# Patient Record
Sex: MALE | Race: WHITE | NOT HISPANIC OR LATINO | URBAN - METROPOLITAN AREA
[De-identification: names, ages, dates, MRNs, and addresses within clinical notes are randomized per-mention and may not be internally consistent; named-entity substitution may affect disease eponyms.]

---

## 2020-03-12 ENCOUNTER — HISTORICAL (OUTPATIENT)
Dept: ADMINISTRATIVE | Facility: HOSPITAL | Age: 65
End: 2020-03-12

## 2022-04-07 ENCOUNTER — HISTORICAL (OUTPATIENT)
Dept: ADMINISTRATIVE | Facility: HOSPITAL | Age: 67
End: 2022-04-07

## 2022-04-10 ENCOUNTER — HISTORICAL (OUTPATIENT)
Dept: ADMINISTRATIVE | Facility: HOSPITAL | Age: 67
End: 2022-04-10

## 2022-04-27 VITALS
DIASTOLIC BLOOD PRESSURE: 82 MMHG | HEIGHT: 71 IN | SYSTOLIC BLOOD PRESSURE: 134 MMHG | BODY MASS INDEX: 24.39 KG/M2 | WEIGHT: 174.19 LBS

## 2022-05-03 NOTE — HISTORICAL [facility] CERNER
This is a historical note converted from Yair. Formatting and pictures may have been removed.  Please reference Yair for original formatting and attached multimedia. Chief Complaint  Bilateral knee pain  History of Present Illness  64 Years??Male?non-smoker?presents to?[facility]?for?initial visit?for?bilateral?knee pain.? Patient points to ?medial knee.  ?   [facility]. 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 Feels left started hurting due to compensatory. Worse medial knees. Worse with use, better with rest. Notices at night. Hasnt affected ROM so much as pain. Uses cane at times. No swelling.?Has been doing acupuncture in town which helps going 1x/wk. Taking naproxen and oxycodone. Uses oxycodone 30 mg about 2 times daily prescribed by Dr. Rice. Uses naproxen bid, unsure dose. PCP is Rad[facility] Robison [facility]. Takes tylenol intermittently which helps some. Had Rusticon (spelling)?injections few years ago in knees that helped for some time.  ?   Onset: ?insidious over years  Current pain level: 2/10 (rated as?moderate) ?medication helping?and acupunture, took oxycodone this am?. Quality described as not bad. 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  PMH: OA b/l, cLBP, history afib s/p ablation  FHx: denies  Social: 50 pack year history quit 2 years ago; drinks few beers in afternoon daily; denies illicits; works in yard daily; lives in home in [place]  alone  Surgeries: multiple back surgeries for injuries  All: NKDA  Review of Systems  denies fever chills  no nausea vomiting  denies cough cold congestion recent illness  denies numbness tingling  denies pain in other body parts  +intermittent SERNA  denies CP  Physical Exam  Vitals & Measurements  T:?36.7? ?C (Oral)? HR:?83(Peripheral)? BP:?134/82?  HT:?180?cm? WT:?79?kg? BMI:?24.38?  General: no acute distress  CV: RRR  Resp: unlabored breathing  Abdomen:?nondistended  Neuro: no focal deficit appreciated, moves all extremities equally  Psych: appropriate mood and affect  ?   R knee:  Inspection: No effusion edema?or erythema appreciated  Palpation: ttp medial joint line  ROM:  with patient in supine position: extension to 10 degrees below horizontal; flexion to 70 degrees between tibia and femur  Sensation: normal light touch  Special tests:  Anterior Lachman: neg  Posterior Lachman: neg  McMurrey: pos  ?   L?knee:  Inspection: No effusion edema?or erythema appreciated  Palpation: ttp medial joint line  ROM:  with patient in supine position: extension to 10 degrees below horizontal; flexion to?60 degrees between tibia and femur  Sensation: normal light touch  Special tests:  Anterior Lachman: neg  Posterior Lachman: neg  McMurrey: pos  ?   Gait: non-antalgic gait, walks without assistive device  Assessment/Plan  1.?Bilateral primary osteoarthritis of knee?M17.0  ?DX: ?bilateral?knee osteoarthritis  Discussed with patient diagnosis and treatment recommendations.? Handout given.  Imaging:?radiological studies ordered and independently reviewed; discussed with patient; agree with?radiologist interpretation; severe degenerative changes b/l R>L with subchondral sclerosis and cyst formation right, bilateral osteophytes, vascular calcifications, lateral and medial joint space narrowing on both sides  Treatment plan: conservative measures, consider ortho referral at follow up if unsatisfactory response to PT; continue  acupuncture  Weight Management is paramount:?maintain weight  Procedure:?discussed CSI vs VS injections as treatment options; declines CSI after shared decision making, prefers to try therapy and continue po meds, acupuncture  Activity:?Activity as tolerated; HEP to included aerobic conditioning with non-painful activity and ROM/STG exercises; proper footwear; assistive device to avoid limping  Therapy:?formal PT/OT ordered  Medication:?first line treatment with daily acetaminophen?1000 mg three times daily; medication precautions given; continue oxycodone as per pain mgmt, naproxen BID, offered alternative nsaid and he declined  RTC:?3 months  Additional work-up:?none  Ordered:  Clinic Follow up, *Est. 06/12/20 3:00:00 CDT, Order for future visit, Bilateral primary osteoarthritis of knee  Alcohol drinker, [facility]  ?  2.?Alcohol drinker?Z72.89  ?-safe drinking practices encouraged, not more than 14 standard drinks a week or 2 a day  Ordered:  Clinic Follow up, *Est. 06/12/20 3:00:00 CDT, Order for future visit, Bilateral primary osteoarthritis of knee  Alcohol drinker, [facility]  ?   Medications  aspirin 81 mg oral Delayed Release (EC) tablet, 81 mg= 1 tab(s), Oral, Daily  CARISOPRODOL 350MG TAB  clonazePAM 0.5 mg oral tablet, 0.5 mg= 1 tab(s), Oral, BID  omega-3 polyunsaturated fatty acids (Fish Oil 1000mg Cap), Oral  OXYCODONE 30MG TAB, 30 mg= 1 tab(s), Oral, QID      Obed Daugherty?was seen in?[facility].??Discussed with?resident at the time of the visit.?History of Present Illness, Physical Exam, and Assessment and Plan reviewed. Treatment plan is reasonable and appropriate. Compliance with treatment recommendations is important.??Radiology images independently reviewed and agree with radiologist interpretation.?- Cher Quesada MD MPH

## 2022-06-29 RX ORDER — METOPROLOL TARTRATE 50 MG/1
25 TABLET ORAL 2 TIMES DAILY
COMMUNITY

## 2022-06-29 RX ORDER — ACETAMINOPHEN 500 MG
4000 TABLET ORAL DAILY
COMMUNITY

## 2022-06-29 RX ORDER — OXYCODONE HYDROCHLORIDE 30 MG/1
30 TABLET ORAL EVERY 6 HOURS PRN
Status: ON HOLD | COMMUNITY
End: 2022-07-20 | Stop reason: HOSPADM

## 2022-06-29 RX ORDER — LANOLIN ALCOHOL/MO/W.PET/CERES
2 CREAM (GRAM) TOPICAL DAILY
COMMUNITY

## 2022-07-17 ENCOUNTER — ANESTHESIA EVENT (OUTPATIENT)
Dept: SURGERY | Facility: HOSPITAL | Age: 67
End: 2022-07-17
Payer: OTHER GOVERNMENT

## 2022-07-18 NOTE — ANESTHESIA PREPROCEDURE EVALUATION
07/17/2022  Obed Daugherty is a 67 y.o., male presents to outpatient surgery for radical robotic prostatectomy with pelvic lymph node dissection for prostate cancer.   Patient tolerated general anesthesia at outside hospital for lap odalis with minimal phenylephrine support.     Nuclear stress test July 1, 2022  EF 53-69%  no evidence of ischemia      Last 3 sets of Vitals    Vitals - 1 value per visit 3/12/2020 6/29/2022 7/19/2022   SYSTOLIC 134 - 145   DIASTOLIC 82 - 82   Pulse - - 91   Temp - - 98.2   Resp - - 18   SPO2 - - 96   Weight (lb) 174.16 194 -   Weight (kg) 79 87.998 -   Height 70.866 70 -   BMI (Calculated) 24.4 27.8 -     Transthoracic echo 2018  EF 53% with grade 3 diastolic dysfunction  2+ DTR, 1+ MR  PA systolic pressure 48   unable to rule out PFO    Pre-op Assessment    I have reviewed the Patient Summary Reports.     I have reviewed the Nursing Notes. I have reviewed the NPO Status.   I have reviewed the Medications.     Review of Systems  Anesthesia Hx:  Personal Hx of Anesthesia complications Denies Post-Operative Nausea/Vomiting.  Denies Difficult Intubation.  Denies Dental Injury.   Social:  Non-Smoker    Cardiovascular:  Cardiovascular Normal   Functional Capacity good / => 4 METS  Disorder of Cardiac Rhythm, Atrial Fibrillation, Paroxysmal Atrial Fibrillation    Pulmonary:  Pulmonary Normal        Physical Exam  General: Well nourished, Cooperative, Alert and Oriented    Airway:  Mallampati: II   Mouth Opening: Normal  TM Distance: Normal  Tongue: Normal  Neck ROM: Normal ROM    Dental:  Intact    Chest/Lungs:  Clear to auscultation, Normal Respiratory Rate    Heart:  Rate: Normal  Rhythm: Regular Rhythm        Anesthesia Plan  Type of Anesthesia, risks & benefits discussed:    Anesthesia Type: Gen ETT  Intra-op Monitoring Plan: Standard ASA Monitors  Post Op Pain Control Plan:  multimodal analgesia and IV/PO Opioids PRN  Induction:  IV  Airway Plan: Direct  Informed Consent: Informed consent signed with the Patient and all parties understand the risks and agree with anesthesia plan.  All questions answered.   ASA Score: 3  Day of Surgery Review of History & Physical: H&P Update referred to the surgeon/provider.  Anesthesia Plan Notes: CBC, BMP, type and crossmatch pending    Ready For Surgery From Anesthesia Perspective.     .

## 2022-07-19 ENCOUNTER — ANESTHESIA (OUTPATIENT)
Dept: SURGERY | Facility: HOSPITAL | Age: 67
End: 2022-07-19
Payer: OTHER GOVERNMENT

## 2022-07-19 ENCOUNTER — HOSPITAL ENCOUNTER (OUTPATIENT)
Facility: HOSPITAL | Age: 67
Discharge: HOME | End: 2022-07-20
Attending: UROLOGY | Admitting: UROLOGY
Payer: OTHER GOVERNMENT

## 2022-07-19 DIAGNOSIS — C61 MALIGNANT NEOPLASM OF PROSTATE: ICD-10-CM

## 2022-07-19 LAB
ABORH RETYPE: NORMAL
ANION GAP SERPL CALC-SCNC: 11 MEQ/L
BASOPHILS # BLD AUTO: 0.02 X10(3)/MCL (ref 0–0.2)
BASOPHILS NFR BLD AUTO: 0.4 %
BUN SERPL-MCNC: 8.8 MG/DL (ref 8.4–25.7)
CALCIUM SERPL-MCNC: 9.5 MG/DL (ref 8.8–10)
CHLORIDE SERPL-SCNC: 101 MMOL/L (ref 98–107)
CO2 SERPL-SCNC: 26 MMOL/L (ref 23–31)
CREAT SERPL-MCNC: 1.01 MG/DL (ref 0.73–1.18)
CREAT/UREA NIT SERPL: 9
EOSINOPHIL # BLD AUTO: 0.12 X10(3)/MCL (ref 0–0.9)
EOSINOPHIL NFR BLD AUTO: 2.3 %
ERYTHROCYTE [DISTWIDTH] IN BLOOD BY AUTOMATED COUNT: 11.9 % (ref 11.5–17)
GLUCOSE SERPL-MCNC: 117 MG/DL (ref 82–115)
GROUP & RH: NORMAL
HCT VFR BLD AUTO: 39 % (ref 42–52)
HCT VFR BLD AUTO: 41.1 % (ref 42–52)
HGB BLD-MCNC: 13.2 GM/DL (ref 14–18)
HGB BLD-MCNC: 14.5 GM/DL (ref 14–18)
IMM GRANULOCYTES # BLD AUTO: 0.03 X10(3)/MCL (ref 0–0.04)
IMM GRANULOCYTES NFR BLD AUTO: 0.6 %
INDIRECT COOMBS GEL: NORMAL
LYMPHOCYTES # BLD AUTO: 0.89 X10(3)/MCL (ref 0.6–4.6)
LYMPHOCYTES NFR BLD AUTO: 17.2 %
MCH RBC QN AUTO: 34.4 PG (ref 27–31)
MCHC RBC AUTO-ENTMCNC: 35.3 MG/DL (ref 33–36)
MCV RBC AUTO: 97.4 FL (ref 80–94)
MONOCYTES # BLD AUTO: 0.71 X10(3)/MCL (ref 0.1–1.3)
MONOCYTES NFR BLD AUTO: 13.8 %
NEUTROPHILS # BLD AUTO: 3.4 X10(3)/MCL (ref 2.1–9.2)
NEUTROPHILS NFR BLD AUTO: 65.7 %
NRBC BLD AUTO-RTO: 0 %
PLATELET # BLD AUTO: 164 X10(3)/MCL (ref 130–400)
PMV BLD AUTO: 8.8 FL (ref 7.4–10.4)
POTASSIUM SERPL-SCNC: 4.1 MMOL/L (ref 3.5–5.1)
RBC # BLD AUTO: 4.22 X10(6)/MCL (ref 4.7–6.1)
SODIUM SERPL-SCNC: 138 MMOL/L (ref 136–145)
WBC # SPEC AUTO: 5.2 X10(3)/MCL (ref 4.5–11.5)

## 2022-07-19 PROCEDURE — 00865 ANES XTRPRT LWR ABD PRST8ECT: CPT | Performed by: UROLOGY

## 2022-07-19 PROCEDURE — 25000003 PHARM REV CODE 250: Performed by: NURSE ANESTHETIST, CERTIFIED REGISTERED

## 2022-07-19 PROCEDURE — 63600175 PHARM REV CODE 636 W HCPCS: Performed by: NURSE ANESTHETIST, CERTIFIED REGISTERED

## 2022-07-19 PROCEDURE — 37000009 HC ANESTHESIA EA ADD 15 MINS: Performed by: UROLOGY

## 2022-07-19 PROCEDURE — 63600175 PHARM REV CODE 636 W HCPCS: Performed by: UROLOGY

## 2022-07-19 PROCEDURE — 37000008 HC ANESTHESIA 1ST 15 MINUTES: Performed by: UROLOGY

## 2022-07-19 PROCEDURE — C1729 CATH, DRAINAGE: HCPCS | Performed by: UROLOGY

## 2022-07-19 PROCEDURE — 86920 COMPATIBILITY TEST SPIN: CPT | Performed by: UROLOGY

## 2022-07-19 PROCEDURE — 80048 BASIC METABOLIC PNL TOTAL CA: CPT | Performed by: UROLOGY

## 2022-07-19 PROCEDURE — 63600175 PHARM REV CODE 636 W HCPCS

## 2022-07-19 PROCEDURE — 94761 N-INVAS EAR/PLS OXIMETRY MLT: CPT

## 2022-07-19 PROCEDURE — 63600175 PHARM REV CODE 636 W HCPCS: Performed by: ANESTHESIOLOGY

## 2022-07-19 PROCEDURE — G0378 HOSPITAL OBSERVATION PER HR: HCPCS

## 2022-07-19 PROCEDURE — 27201423 OPTIME MED/SURG SUP & DEVICES STERILE SUPPLY: Performed by: UROLOGY

## 2022-07-19 PROCEDURE — 11000001 HC ACUTE MED/SURG PRIVATE ROOM

## 2022-07-19 PROCEDURE — 36000713 HC OR TIME LEV V EA ADD 15 MIN: Performed by: UROLOGY

## 2022-07-19 PROCEDURE — V2790 AMNIOTIC MEMBRANE: HCPCS | Performed by: UROLOGY

## 2022-07-19 PROCEDURE — G0379 DIRECT REFER HOSPITAL OBSERV: HCPCS

## 2022-07-19 PROCEDURE — 25000003 PHARM REV CODE 250: Performed by: UROLOGY

## 2022-07-19 PROCEDURE — 25000003 PHARM REV CODE 250: Performed by: ANESTHESIOLOGY

## 2022-07-19 PROCEDURE — 25000003 PHARM REV CODE 250

## 2022-07-19 PROCEDURE — 36000712 HC OR TIME LEV V 1ST 15 MIN: Performed by: UROLOGY

## 2022-07-19 PROCEDURE — 36415 COLL VENOUS BLD VENIPUNCTURE: CPT | Performed by: UROLOGY

## 2022-07-19 PROCEDURE — 86850 RBC ANTIBODY SCREEN: CPT | Performed by: UROLOGY

## 2022-07-19 PROCEDURE — 71000033 HC RECOVERY, INTIAL HOUR: Performed by: UROLOGY

## 2022-07-19 PROCEDURE — 36620 INSERTION CATHETER ARTERY: CPT | Performed by: ANESTHESIOLOGY

## 2022-07-19 PROCEDURE — 85014 HEMATOCRIT: CPT | Performed by: UROLOGY

## 2022-07-19 PROCEDURE — 27000221 HC OXYGEN, UP TO 24 HOURS

## 2022-07-19 PROCEDURE — 85025 COMPLETE CBC W/AUTO DIFF WBC: CPT | Performed by: UROLOGY

## 2022-07-19 DEVICE — PATCH AMNION DUAL LAYER 4X8CM: Type: IMPLANTABLE DEVICE | Site: PROSTATE | Status: FUNCTIONAL

## 2022-07-19 RX ORDER — SODIUM CHLORIDE 9 MG/ML
INJECTION, SOLUTION INTRAVENOUS CONTINUOUS
Status: DISCONTINUED | OUTPATIENT
Start: 2022-07-19 | End: 2022-07-20 | Stop reason: HOSPADM

## 2022-07-19 RX ORDER — ACETAMINOPHEN 10 MG/ML
INJECTION, SOLUTION INTRAVENOUS
Status: DISPENSED
Start: 2022-07-19 | End: 2022-07-19

## 2022-07-19 RX ORDER — GABAPENTIN 300 MG/1
300 CAPSULE ORAL ONCE
Status: COMPLETED | OUTPATIENT
Start: 2022-07-19 | End: 2022-07-19

## 2022-07-19 RX ORDER — CEFAZOLIN SODIUM 2 G/50ML
2 SOLUTION INTRAVENOUS
Status: DISCONTINUED | OUTPATIENT
Start: 2022-07-19 | End: 2022-07-20 | Stop reason: HOSPADM

## 2022-07-19 RX ORDER — METHOCARBAMOL 100 MG/ML
INJECTION, SOLUTION INTRAMUSCULAR; INTRAVENOUS
Status: COMPLETED
Start: 2022-07-19 | End: 2022-07-19

## 2022-07-19 RX ORDER — HYDROCODONE BITARTRATE AND ACETAMINOPHEN 500; 5 MG/1; MG/1
TABLET ORAL
Status: DISCONTINUED | OUTPATIENT
Start: 2022-07-19 | End: 2022-07-20 | Stop reason: HOSPADM

## 2022-07-19 RX ORDER — ZOLPIDEM TARTRATE 5 MG/1
5 TABLET ORAL NIGHTLY
Status: DISCONTINUED | OUTPATIENT
Start: 2022-07-19 | End: 2022-07-20 | Stop reason: HOSPADM

## 2022-07-19 RX ORDER — CEFAZOLIN SODIUM 2 G/50ML
2 SOLUTION INTRAVENOUS
Status: COMPLETED | OUTPATIENT
Start: 2022-07-19 | End: 2022-07-20

## 2022-07-19 RX ORDER — PROMETHAZINE HYDROCHLORIDE 25 MG/1
25 TABLET ORAL EVERY 6 HOURS PRN
Status: DISCONTINUED | OUTPATIENT
Start: 2022-07-19 | End: 2022-07-20 | Stop reason: HOSPADM

## 2022-07-19 RX ORDER — ONDANSETRON 4 MG/1
8 TABLET, ORALLY DISINTEGRATING ORAL EVERY 8 HOURS PRN
Status: DISCONTINUED | OUTPATIENT
Start: 2022-07-19 | End: 2022-07-20 | Stop reason: HOSPADM

## 2022-07-19 RX ORDER — MEPERIDINE HYDROCHLORIDE 25 MG/ML
12.5 INJECTION INTRAMUSCULAR; INTRAVENOUS; SUBCUTANEOUS EVERY 10 MIN PRN
Status: COMPLETED | OUTPATIENT
Start: 2022-07-19 | End: 2022-07-19

## 2022-07-19 RX ORDER — LIDOCAINE HYDROCHLORIDE 20 MG/ML
INJECTION, SOLUTION EPIDURAL; INFILTRATION; INTRACAUDAL; PERINEURAL
Status: DISCONTINUED | OUTPATIENT
Start: 2022-07-19 | End: 2022-07-19

## 2022-07-19 RX ORDER — MIDAZOLAM HYDROCHLORIDE 1 MG/ML
INJECTION INTRAMUSCULAR; INTRAVENOUS
Status: DISPENSED
Start: 2022-07-19 | End: 2022-07-19

## 2022-07-19 RX ORDER — ACETAMINOPHEN 10 MG/ML
1000 INJECTION, SOLUTION INTRAVENOUS EVERY 8 HOURS
Status: DISCONTINUED | OUTPATIENT
Start: 2022-07-19 | End: 2022-07-19

## 2022-07-19 RX ORDER — ROCURONIUM BROMIDE 10 MG/ML
INJECTION, SOLUTION INTRAVENOUS
Status: DISCONTINUED | OUTPATIENT
Start: 2022-07-19 | End: 2022-07-19

## 2022-07-19 RX ORDER — CELECOXIB 200 MG/1
200 CAPSULE ORAL ONCE
Status: COMPLETED | OUTPATIENT
Start: 2022-07-19 | End: 2022-07-19

## 2022-07-19 RX ORDER — ONDANSETRON 2 MG/ML
INJECTION INTRAMUSCULAR; INTRAVENOUS
Status: DISCONTINUED | OUTPATIENT
Start: 2022-07-19 | End: 2022-07-19

## 2022-07-19 RX ORDER — HYDROMORPHONE HYDROCHLORIDE 2 MG/ML
0.4 INJECTION, SOLUTION INTRAMUSCULAR; INTRAVENOUS; SUBCUTANEOUS EVERY 5 MIN PRN
Status: DISCONTINUED | OUTPATIENT
Start: 2022-07-19 | End: 2022-07-20 | Stop reason: HOSPADM

## 2022-07-19 RX ORDER — DEXAMETHASONE SODIUM PHOSPHATE 4 MG/ML
INJECTION, SOLUTION INTRA-ARTICULAR; INTRALESIONAL; INTRAMUSCULAR; INTRAVENOUS; SOFT TISSUE
Status: DISCONTINUED | OUTPATIENT
Start: 2022-07-19 | End: 2022-07-19

## 2022-07-19 RX ORDER — MIDAZOLAM HYDROCHLORIDE 1 MG/ML
INJECTION INTRAMUSCULAR; INTRAVENOUS
Status: DISCONTINUED | OUTPATIENT
Start: 2022-07-19 | End: 2022-07-19

## 2022-07-19 RX ORDER — METOPROLOL TARTRATE 25 MG/1
25 TABLET, FILM COATED ORAL 2 TIMES DAILY
Status: DISCONTINUED | OUTPATIENT
Start: 2022-07-19 | End: 2022-07-20 | Stop reason: HOSPADM

## 2022-07-19 RX ORDER — HYDROCODONE BITARTRATE AND ACETAMINOPHEN 10; 325 MG/1; MG/1
1 TABLET ORAL EVERY 4 HOURS PRN
Status: DISCONTINUED | OUTPATIENT
Start: 2022-07-19 | End: 2022-07-20 | Stop reason: HOSPADM

## 2022-07-19 RX ORDER — HYDROMORPHONE HYDROCHLORIDE 2 MG/ML
INJECTION, SOLUTION INTRAMUSCULAR; INTRAVENOUS; SUBCUTANEOUS
Status: DISCONTINUED | OUTPATIENT
Start: 2022-07-19 | End: 2022-07-19

## 2022-07-19 RX ORDER — PHENYLEPHRINE HYDROCHLORIDE 10 MG/ML
INJECTION INTRAVENOUS
Status: DISCONTINUED | OUTPATIENT
Start: 2022-07-19 | End: 2022-07-19

## 2022-07-19 RX ORDER — UBIDECARENONE 75 MG
2000 CAPSULE ORAL DAILY
Status: DISCONTINUED | OUTPATIENT
Start: 2022-07-20 | End: 2022-07-20 | Stop reason: HOSPADM

## 2022-07-19 RX ORDER — CEFAZOLIN SODIUM 1 G/3ML
INJECTION, POWDER, FOR SOLUTION INTRAMUSCULAR; INTRAVENOUS
Status: DISCONTINUED | OUTPATIENT
Start: 2022-07-19 | End: 2022-07-19

## 2022-07-19 RX ORDER — METHOCARBAMOL 100 MG/ML
1000 INJECTION, SOLUTION INTRAMUSCULAR; INTRAVENOUS ONCE
Status: COMPLETED | OUTPATIENT
Start: 2022-07-19 | End: 2022-07-19

## 2022-07-19 RX ORDER — BUPIVACAINE HYDROCHLORIDE 2.5 MG/ML
INJECTION, SOLUTION EPIDURAL; INFILTRATION; INTRACAUDAL
Status: DISCONTINUED | OUTPATIENT
Start: 2022-07-19 | End: 2022-07-19 | Stop reason: HOSPADM

## 2022-07-19 RX ORDER — PROPOFOL 10 MG/ML
VIAL (ML) INTRAVENOUS
Status: DISCONTINUED | OUTPATIENT
Start: 2022-07-19 | End: 2022-07-19

## 2022-07-19 RX ORDER — FENTANYL CITRATE 50 UG/ML
INJECTION, SOLUTION INTRAMUSCULAR; INTRAVENOUS
Status: DISCONTINUED | OUTPATIENT
Start: 2022-07-19 | End: 2022-07-19

## 2022-07-19 RX ORDER — KETOROLAC TROMETHAMINE 30 MG/ML
15 INJECTION, SOLUTION INTRAMUSCULAR; INTRAVENOUS EVERY 8 HOURS
Status: DISCONTINUED | OUTPATIENT
Start: 2022-07-19 | End: 2022-07-20 | Stop reason: HOSPADM

## 2022-07-19 RX ORDER — OXYCODONE HYDROCHLORIDE 5 MG/1
20 TABLET ORAL EVERY 6 HOURS PRN
Status: DISCONTINUED | OUTPATIENT
Start: 2022-07-19 | End: 2022-07-20 | Stop reason: HOSPADM

## 2022-07-19 RX ORDER — ACETAMINOPHEN 10 MG/ML
1000 INJECTION, SOLUTION INTRAVENOUS EVERY 8 HOURS
Status: DISPENSED | OUTPATIENT
Start: 2022-07-19 | End: 2022-07-20

## 2022-07-19 RX ORDER — CHOLECALCIFEROL (VITAMIN D3) 25 MCG
4000 TABLET ORAL DAILY
Status: DISCONTINUED | OUTPATIENT
Start: 2022-07-20 | End: 2022-07-20 | Stop reason: HOSPADM

## 2022-07-19 RX ORDER — ZOLPIDEM TARTRATE 5 MG/1
5 TABLET ORAL NIGHTLY
Status: ON HOLD | COMMUNITY
End: 2022-07-20 | Stop reason: HOSPADM

## 2022-07-19 RX ORDER — OXYBUTYNIN CHLORIDE 5 MG/1
5 TABLET ORAL 3 TIMES DAILY
Status: DISCONTINUED | OUTPATIENT
Start: 2022-07-19 | End: 2022-07-20 | Stop reason: HOSPADM

## 2022-07-19 RX ORDER — ZOLPIDEM TARTRATE 5 MG/1
5 TABLET ORAL NIGHTLY PRN
Status: DISCONTINUED | OUTPATIENT
Start: 2022-07-19 | End: 2022-07-20 | Stop reason: HOSPADM

## 2022-07-19 RX ORDER — EPHEDRINE SULFATE 50 MG/ML
INJECTION, SOLUTION INTRAVENOUS
Status: DISCONTINUED | OUTPATIENT
Start: 2022-07-19 | End: 2022-07-19

## 2022-07-19 RX ADMIN — HYDROMORPHONE HYDROCHLORIDE 0.4 MG: 2 INJECTION, SOLUTION INTRAMUSCULAR; INTRAVENOUS; SUBCUTANEOUS at 04:07

## 2022-07-19 RX ADMIN — SODIUM CHLORIDE, SODIUM GLUCONATE, SODIUM ACETATE, POTASSIUM CHLORIDE AND MAGNESIUM CHLORIDE: 526; 502; 368; 37; 30 INJECTION, SOLUTION INTRAVENOUS at 11:07

## 2022-07-19 RX ADMIN — SUGAMMADEX 100 MG: 100 INJECTION, SOLUTION INTRAVENOUS at 03:07

## 2022-07-19 RX ADMIN — HYDROMORPHONE HYDROCHLORIDE 0.4 MG: 2 INJECTION, SOLUTION INTRAMUSCULAR; INTRAVENOUS; SUBCUTANEOUS at 03:07

## 2022-07-19 RX ADMIN — EPHEDRINE SULFATE 5 MG: 50 INJECTION INTRAVENOUS at 02:07

## 2022-07-19 RX ADMIN — SODIUM CHLORIDE, SODIUM GLUCONATE, SODIUM ACETATE, POTASSIUM CHLORIDE AND MAGNESIUM CHLORIDE: 526; 502; 368; 37; 30 INJECTION, SOLUTION INTRAVENOUS at 03:07

## 2022-07-19 RX ADMIN — HYDROMORPHONE HYDROCHLORIDE 0.5 MG: 2 INJECTION INTRAMUSCULAR; INTRAVENOUS; SUBCUTANEOUS at 12:07

## 2022-07-19 RX ADMIN — FENTANYL CITRATE 50 MCG: 50 INJECTION, SOLUTION INTRAMUSCULAR; INTRAVENOUS at 12:07

## 2022-07-19 RX ADMIN — MEPERIDINE HYDROCHLORIDE 12.5 MG: 25 INJECTION INTRAMUSCULAR; INTRAVENOUS; SUBCUTANEOUS at 05:07

## 2022-07-19 RX ADMIN — CEFAZOLIN SODIUM 2 G: 2 SOLUTION INTRAVENOUS at 08:07

## 2022-07-19 RX ADMIN — CEFAZOLIN 2 G: 330 INJECTION, POWDER, FOR SOLUTION INTRAMUSCULAR; INTRAVENOUS at 12:07

## 2022-07-19 RX ADMIN — ROCURONIUM BROMIDE 20 MG: 10 SOLUTION INTRAVENOUS at 12:07

## 2022-07-19 RX ADMIN — SUGAMMADEX 50 MG: 100 INJECTION, SOLUTION INTRAVENOUS at 03:07

## 2022-07-19 RX ADMIN — ROCURONIUM BROMIDE 30 MG: 10 SOLUTION INTRAVENOUS at 02:07

## 2022-07-19 RX ADMIN — ZOLPIDEM TARTRATE 5 MG: 5 TABLET ORAL at 08:07

## 2022-07-19 RX ADMIN — HYDROCODONE BITARTRATE AND ACETAMINOPHEN 1 TABLET: 10; 325 TABLET ORAL at 08:07

## 2022-07-19 RX ADMIN — PHENYLEPHRINE HYDROCHLORIDE 100 MCG: 10 INJECTION INTRAVENOUS at 12:07

## 2022-07-19 RX ADMIN — ACETAMINOPHEN 1000 MG: 10 INJECTION, SOLUTION INTRAVENOUS at 11:07

## 2022-07-19 RX ADMIN — DEXAMETHASONE SODIUM PHOSPHATE 4 MG: 4 INJECTION, SOLUTION INTRA-ARTICULAR; INTRALESIONAL; INTRAMUSCULAR; INTRAVENOUS; SOFT TISSUE at 12:07

## 2022-07-19 RX ADMIN — EPHEDRINE SULFATE 5 MG: 50 INJECTION INTRAVENOUS at 03:07

## 2022-07-19 RX ADMIN — ROCURONIUM BROMIDE 40 MG: 10 SOLUTION INTRAVENOUS at 12:07

## 2022-07-19 RX ADMIN — GABAPENTIN 300 MG: 300 CAPSULE ORAL at 10:07

## 2022-07-19 RX ADMIN — MIDAZOLAM 2 MG: 1 INJECTION INTRAMUSCULAR; INTRAVENOUS at 11:07

## 2022-07-19 RX ADMIN — LIDOCAINE HYDROCHLORIDE 80 MG: 20 INJECTION, SOLUTION EPIDURAL; INFILTRATION; INTRACAUDAL; PERINEURAL at 12:07

## 2022-07-19 RX ADMIN — CELECOXIB 200 MG: 200 CAPSULE ORAL at 10:07

## 2022-07-19 RX ADMIN — METHOCARBAMOL 1000 MG: 100 INJECTION, SOLUTION INTRAMUSCULAR; INTRAVENOUS at 04:07

## 2022-07-19 RX ADMIN — KETOROLAC TROMETHAMINE 15 MG: 30 INJECTION, SOLUTION INTRAMUSCULAR at 09:07

## 2022-07-19 RX ADMIN — HYDROMORPHONE HYDROCHLORIDE 0.4 MG: 2 INJECTION, SOLUTION INTRAMUSCULAR; INTRAVENOUS; SUBCUTANEOUS at 05:07

## 2022-07-19 RX ADMIN — ONDANSETRON 4 MG: 2 INJECTION INTRAMUSCULAR; INTRAVENOUS at 03:07

## 2022-07-19 RX ADMIN — PROPOFOL 170 MG: 10 INJECTION, EMULSION INTRAVENOUS at 12:07

## 2022-07-19 RX ADMIN — METOPROLOL TARTRATE 25 MG: 25 TABLET, FILM COATED ORAL at 08:07

## 2022-07-19 RX ADMIN — ROCURONIUM BROMIDE 20 MG: 10 SOLUTION INTRAVENOUS at 01:07

## 2022-07-19 RX ADMIN — OXYBUTYNIN CHLORIDE 5 MG: 5 TABLET ORAL at 08:07

## 2022-07-19 NOTE — ANESTHESIA POSTPROCEDURE EVALUATION
Anesthesia Post Evaluation    Patient: Obed Daugherty    Procedure(s) Performed: Procedure(s) (LRB):  XI ROBOTIC PROSTATECTOMY (N/A)    Final Anesthesia Type: general      Patient location during evaluation: PACU  Patient participation: Yes- Able to Participate  Level of consciousness: awake and alert and oriented  Post-procedure vital signs: reviewed and stable  Pain management: adequate  Airway patency: patent  FLOR mitigation strategies: Verification of full reversal of neuromuscular block  PONV status at discharge: No PONV  Anesthetic complications: no      Cardiovascular status: blood pressure returned to baseline and stable  Respiratory status: spontaneous ventilation, unassisted and room air  Hydration status: euvolemic  Follow-up not needed.  Comments: [facility]          Vitals Value Taken Time   /73 07/19/22 1731   Temp 36.5 °C (97.7 °F) 07/19/22 1548   Pulse 74 07/19/22 1740   Resp 15 07/19/22 1745   SpO2 99 % 07/19/22 1740   Vitals shown include unvalidated device data.      Event Time   Out of Recovery 17:50:00         Pain/Geetha Score: Pain Rating Prior to Med Admin: 7 (7/19/2022  5:20 PM)  Geetha Score: 10 (7/19/2022  5:30 PM)

## 2022-07-19 NOTE — TRANSFER OF CARE
"Anesthesia Transfer of Care Note    Patient: Obed Daugherty    Procedure(s) Performed: Procedure(s) (LRB):  XI ROBOTIC PROSTATECTOMY (N/A)    Patient location: PACU    Anesthesia Type: general    Transport from OR: Transported from OR on 100% O2 by closed face mask with adequate spontaneous ventilation    Post pain: adequate analgesia    Post assessment: no apparent anesthetic complications and tolerated procedure well    Post vital signs: stable    Level of consciousness: awake, alert and oriented    Nausea/Vomiting: no nausea/vomiting    Complications: none    Transfer of care protocol was followed      Last vitals:   Visit Vitals  BP (!) 141/84 (BP Location: Left arm, Patient Position: Lying)   Pulse 79   Temp 36.8 °C (98.2 °F) (Oral)   Resp 17   Ht 5' 10" (1.778 m)   Wt 83.7 kg (184 lb 8.4 oz)   SpO2 98%   BMI 26.48 kg/m²     "

## 2022-07-19 NOTE — OP NOTE
PREOPERATIVE DIAGNOSIS(ES):  Prostate cancer    PROCEDURE(S)/OPERATION(S) PERFORMED:  1. Robot assist laparoscopic radical prostatectomy.  2. Robot assist bilateral pelvic lymph node dissection.  3. Implantation of allograft tissue    FINDINGS:  1. There was no evidence of prostate cancer outside the prostate.  2. YOSELIN revealed normal  3. He had a bladder neck sparing procedure.  4. His urethral bladder anastomosis was tested with 120 mL of normal saline. There was no  extravasation seen.  5. bilateral neurovascular bundles were spared  6. Endopelvic fascia was spared.    SPECIMENS:  1. Prostate plus seminal vesicles en bloc.  2. Anterior bladder neck lymph nodes  3. External iliac lymph nodes and obturator lymph nodes Bilaterally    FLUIDS:  1000 mL crystalloid.    ESTIMATED BLOOD LOSS:  100 ml     DRAINS:  1. 19-Chadian Lebron drain.  2. 18-Chadian Javed catheter.    CONDITION:  Stable to PACU.    INDICATION FOR PROCEDURE:  This is a 67 Male, who was found to have an elevated PSA. He then underwent a  prostate biopsy, which confirmed the above mentioned [place] score and clinical stage.  He was apprised of his options. He elected to proceed with robotic-assisted laparoscopic prostatectomy. Consents were signed. He understands the risk and complications and would like to proceed with surgery. PCDs and heparin were used for DVT prophylaxis. Appropriate antibiotics were used for antibiotic prophylaxis.    SUMMARY:  After adequate general anesthetic, he was carefully positioned in low lithotomy. His arms were  tucked at his sides. All pressure points were carefully padded to prevent neuromuscular injury.  The table was placed in a steep Trendelenburg position. The abdomen and genitalia were  shaved and prepped and draped sterile fashion. A time-out was performed with two patient identifiers.  A 16-Chadian 2-way Javed catheter was placed in the bladder with 10 mL of sterile water in the  balloon, and the bladder was  drained.    A Veress needle was used to access the peritoneal cavity at the umbilicus. Saline drop test and  advancement tests were negative. The abdomen was insufflated at low insufflation pressures of  CO2 gas. We insufflated with low flow and initial pressures below 4 mmHg and gradually  insufflated up to 15 mmHg. A 1 cm incision was made just above the belly button horizontally  and a 10-mm trocar camera trocar was inserted. Initial laparoscopy revealed findings  as  noted above.    We then placed 2 robotic trocars on either side of the midline measuring 18 cm from the midline  at the pubic bone up to the port sites which were 9 cm lateral of the umbilicus and another robot  trocar was placed in the left lateral abdomen two finger breaths off the left ASIC. Two assistant  ports were placed, one 12 mm Surgiquest trocar in the right lateral abdomen two finger breaths  off the right ASIC and another 5mm trocar in the right subcostal position. All ports were placed  under direct vision.    The robot was then docked.  We then took down some adhesions of the sigmoid colon in  order to fully retract the rectum out of the pelvis.After this, the peritoneum between the bladder   and the rectum was incised to expose the seminal vesicles and vas deferens. The seminal vesicles   were then swept up off denovillier's fascia. We then clipped and divided the vas deferens at the tip of the  seminal vesicles and then clipped and divided the vascular structures on the posterior lateral  edges of the seminal vesicles. The SVs and ampulla of the Vas were then dissected down into  their insertion into the prostate. We then incised through Denonvilliers fascia with cold scissors  and swept the prostate up off the rectum exposing the vascular bundles laterally and the apex of  the prostate in the midline.    After this was done, we then performed a bilateral extended pelvic lymph node dissection on  both the left and right-hand side.  The superior borders of dissection were the lateral border of  the external iliac arter, medially was where the ureter crosses over the external iliac artery, and  lateral where the circumflex iliac vein crosses over the external iliac artery. The distal medial  boundary was the bladder and the distal lateral boundary was the obturator fossa. The inferior  boundary was the endopelvic fascia. During the course of the dissection, the obturator nerve  was identified and kept out of harm's way. The bilateral nate packets taken were the external  iliac lymph nodes, internal iliac lymph nodes and obturator lymph nodes. A piece of Nuknit  hemostatic material was placed into the obturator fossa bilaterally.    The bladder was then dropped entering the space of Retzius by incising lateral to the medial  umbilical ligament on either side up until where they joined. The vas deferens were then  clipped and divided. Endopelvic fascia was exposed but not divided. We then cauterized and  divided the superficial dorsal vein and removed the anterior bladder neck fat and lymph nodes  overlying the junction of the bladder and the prostate.    We then switched to a 30-degree telescope and performed the bladder neck dissection by  entering the retrovesical space through the space of Daniel on both the right and left-hand  sides. The bladder neck was then disected away from the prostate in a lateral to medial and  posterior to anterior fashion. The urethra and bladder neck were then isolated, where it funnels  into the prostate. It was then divided. The bladder neck was spared to the size of the 16fr barbosa.  We then switched back to a 0-degree telescope and completed the posterior dissection, better  exposing the apex of the prostate and the vascular bundles laterally.    After taking down the prostate pedicle between clips, a bilateral intrafascial nerve sparing procedure was performed.  . The anterior veil structure were taken down at  10 and 2 O'clock and the DVC was then exposed. We then stapled the DVC with one application of an Ethicon 45-mm stapling device  using a blue load making sure the barbosa catheter moved easily to protect the urethra. We then  performed the apical dissection and prepared the urethra.    We then divided the urethra anteriorly and placed a 6 o'clock 2-0 Vicryl stitch into the urethra.  After this was done, we then divided the posterior urethra. The prostate was then bagged and  placed out of harm's way in the right upper quadrant. We then used two baby laps to hold  pressure in the pelvis for a minute. After this, we then assessed for Hemostasis.   Hemostasis was adequate.    We then performed a Schuyler posterior reconstruction with a 3-0 stratafix suture. After this was  done, we then placed a 6 o'clock urethral stitch into the 6 o'clock position on the bladder and  then tied this down. We then performed the vesicourethral anastomosis using two 3-0 stratafix sutures looped together running them from the 5 and 7 o'clock position on each side up to the  12 o'clock position. The two were then tied together.We then tested the anastomosis with 120 mL   of normal saline. There was no extravasation seen. The 16-Slovak Barbosa catheter was removed   and a new 18-Slovak Barbosa catheter was placed with 10 mL in the balloon.    At this point, the allograft (evopatch) was implanted along the bilateral Neurovascular bundles to allow for improved wound healing.    The robot was then undocked, and a drain was placed in the pelvis through the left iliac port.  The drain was secured with 3-0 nylon. We then visualized all the ports as they were removed. There was no bleeding seen from any of the sites. We then widened the fascia of the assistant port site, removed the specimen and inspected it and sent it to Pathology.    The fascia was reapproximated with a running 0-Vicryl suture. All wounds were infiltrated with  0.25% Marcaine and  experell a total of 50 mL. The skin edges were reapproximated using a running subcuticular 4-0 Monocryl suture. Dermabond dressing was applied to the skin incisions. All sponge and needle counts were correct x2.    The patient tolerated the procedure well. Catheter and drain were secured to the left leg. He  was extubated and transported to the recovery room in stable condition. His family was  informed of the outcome following the procedure.     07/19/2022   3:53 PM

## 2022-07-19 NOTE — ANESTHESIA PROCEDURE NOTES
Intubation    Date/Time: 7/19/2022 12:06 PM  Performed by: Dick Alberts CRNA  Authorized by: Wally Gay Jr., MD     Intubation:     Induction:  Intravenous    Intubated:  Postinduction    Mask Ventilation:  Easy mask    Attempts:  1    Attempted By:  Student    Method of Intubation:  Direct    Blade:  Kayla 3    Laryngeal View Grade: Grade IIA - cords partially seen      Difficult Airway Encountered?: No      Complications:  None    Airway Device:  Oral endotracheal tube    Airway Device Size:  7.5    Style/Cuff Inflation:  Cuffed (inflated to minimal occlusive pressure)    Tube secured:  23    Secured at:  The teeth    Placement Verified By:  Capnometry    Complicating Factors:  None    Findings Post-Intubation:  BS equal bilateral and atraumatic/condition of teeth unchanged

## 2022-07-19 NOTE — ANESTHESIA PROCEDURE NOTES
Arterial    Diagnosis: prostate Ca    Patient location during procedure: holding area  Procedure start time: 7/19/2022 11:37 AM  Timeout: 7/19/2022 11:36 AM  Procedure end time: 7/19/2022 11:39 AM    Staffing  Authorizing Provider: Wally Gay Jr., MD  Performing Provider: Wally Gay Jr., MD    Anesthesiologist was present at the time of the procedure.    Preanesthetic Checklist  Completed: patient identified, IV checked, site marked, risks and benefits discussed, surgical consent, monitors and equipment checked, pre-op evaluation, timeout performed and anesthesia consent givenArterial  Skin Prep: alcohol swabs  Local Infiltration: lidocaine  Orientation: left  Location: radial    Catheter Size: 20 G  Catheter placement by Anatomical landmarks. Heme positive aspiration all ports. Insertion Attempts: 1  Assessment  Dressing: secured with tape and tegaderm  Patient: Tolerated well

## 2022-07-20 VITALS
SYSTOLIC BLOOD PRESSURE: 128 MMHG | HEART RATE: 82 BPM | HEIGHT: 70 IN | TEMPERATURE: 98 F | WEIGHT: 184.5 LBS | RESPIRATION RATE: 16 BRPM | DIASTOLIC BLOOD PRESSURE: 79 MMHG | BODY MASS INDEX: 26.41 KG/M2 | OXYGEN SATURATION: 94 %

## 2022-07-20 LAB
ANION GAP SERPL CALC-SCNC: 7 MEQ/L
BUN SERPL-MCNC: 9.2 MG/DL (ref 8.4–25.7)
CALCIUM SERPL-MCNC: 8.1 MG/DL (ref 8.8–10)
CHLORIDE SERPL-SCNC: 101 MMOL/L (ref 98–107)
CO2 SERPL-SCNC: 26 MMOL/L (ref 23–31)
CREAT SERPL-MCNC: 0.83 MG/DL (ref 0.73–1.18)
CREAT/UREA NIT SERPL: 11
GLUCOSE SERPL-MCNC: 147 MG/DL (ref 82–115)
POTASSIUM SERPL-SCNC: 4 MMOL/L (ref 3.5–5.1)
SODIUM SERPL-SCNC: 134 MMOL/L (ref 136–145)

## 2022-07-20 PROCEDURE — 80048 BASIC METABOLIC PNL TOTAL CA: CPT | Performed by: UROLOGY

## 2022-07-20 PROCEDURE — 97161 PT EVAL LOW COMPLEX 20 MIN: CPT

## 2022-07-20 PROCEDURE — 25000003 PHARM REV CODE 250: Performed by: UROLOGY

## 2022-07-20 PROCEDURE — G0378 HOSPITAL OBSERVATION PER HR: HCPCS

## 2022-07-20 PROCEDURE — 63600175 PHARM REV CODE 636 W HCPCS: Performed by: ANESTHESIOLOGY

## 2022-07-20 PROCEDURE — 94761 N-INVAS EAR/PLS OXIMETRY MLT: CPT

## 2022-07-20 PROCEDURE — 63600175 PHARM REV CODE 636 W HCPCS: Performed by: UROLOGY

## 2022-07-20 PROCEDURE — 27000221 HC OXYGEN, UP TO 24 HOURS

## 2022-07-20 PROCEDURE — 36415 COLL VENOUS BLD VENIPUNCTURE: CPT | Performed by: UROLOGY

## 2022-07-20 RX ADMIN — METOPROLOL TARTRATE 25 MG: 25 TABLET, FILM COATED ORAL at 08:07

## 2022-07-20 RX ADMIN — OXYCODONE 20 MG: 5 TABLET ORAL at 11:07

## 2022-07-20 RX ADMIN — CEFAZOLIN SODIUM 2 G: 2 SOLUTION INTRAVENOUS at 05:07

## 2022-07-20 RX ADMIN — ACETAMINOPHEN 1000 MG: 10 INJECTION, SOLUTION INTRAVENOUS at 06:07

## 2022-07-20 RX ADMIN — CYANOCOBALAMIN TAB 500 MCG 2000 MCG: 500 TAB at 08:07

## 2022-07-20 RX ADMIN — KETOROLAC TROMETHAMINE: 30 INJECTION, SOLUTION INTRAMUSCULAR at 05:07

## 2022-07-20 RX ADMIN — OXYCODONE 20 MG: 5 TABLET ORAL at 07:07

## 2022-07-20 RX ADMIN — CHOLECALCIFEROL TAB 25 MCG (1000 UNIT) 4000 UNITS: 25 TAB at 08:07

## 2022-07-20 RX ADMIN — OXYBUTYNIN CHLORIDE 5 MG: 5 TABLET ORAL at 08:07

## 2022-07-20 NOTE — PT/OT/SLP EVAL
Physical Therapy Evaluation and Discharge Note    Patient Name:  Obed Daugherty   MRN:  [id number]    Recommendations:     Discharge Recommendations:  home   Discharge Equipment Recommendations: walker, rolling   Barriers to discharge: none    Assessment:     Obed Daugherty is a 67 y.o. male admitted with a medical diagnosis of Malignant neoplasm of prostate, s/p prostatectomy. Patient reports he initially felt weak with mobility post-op, states he is doing better now. Patient requesting RW upon d/c to improve stability and reduce fall risk. Patient independent with bed mobility and sit<>stand with RW. Pt ambulated 150ft with RW and supervision, decreased gait speed, however no LOB observed. Patient agreeable with d/c home, requesting RW- PT in agreement. Patient with no further acute PT needs, will sign off.     Recent Surgery: Procedure(s) (LRB):  XI ROBOTIC PROSTATECTOMY (N/A) 1 Day Post-Op    Plan:     During this hospitalization, patient does not require further acute PT services.  Please re-consult if situation changes.      Subjective     Chief Complaint: none stated  Patient/Family Comments/goals: to go home  Pain/Comfort:  · Pain Rating 1: 0/10    Patients cultural, spiritual, Scientologist conflicts given the current situation: no    Living Environment:  Pt lives alone, reports he will have assistance from son and friend upon d/c.   Prior to admission, patients level of function was independent.  Equipment used at home: none.  DME owned (not currently used): none.  Upon discharge, patient will have assistance from family/friend.    Objective:     Communicated with RN prior to session.  Patient found supine with barbosa catheter upon PT entry to room.    General Precautions: Standard,     Orthopedic Precautions:N/A   Braces: N/A   Respiratory Status: Room air    Exams:  · Sensation: -       Intact  · RLE ROM: WNL  · RLE Strength: WNL  · LLE ROM: WNL  · LLE Strength: WNL    Functional Mobility:  · Bed  Mobility:  Supine to Sit: independence  · Transfers:  Sit to Stand:  modified independence with rolling walker  · Gait: Pt ambulated 150ft with RW, supervision.     AM-PAC 6 CLICK MOBILITY  Total Score:24     AM-PAC 6 CLICK MOBILITY  Total Score:24     Patient left supine with all lines intact, call button in reach and RN notified.    GOALS:   Multidisciplinary Problems     Physical Therapy Goals     Not on file                History:     Past Medical History:   Diagnosis Date    Arthritis     Atrial fibrillation     Leg pain, bilateral     Low back pain     Prostate cancer     PTSD (post-traumatic stress disorder)     Sleep apnea     does not use a CPAP       Past Surgical History:   Procedure Laterality Date    BACK SURGERY      x3    CATARACT EXTRACTION Bilateral     CHOLECYSTECTOMY      COLONOSCOPY      ESOPHAGOGASTRODUODENOSCOPY      ROBOT-ASSISTED LAPAROSCOPIC PROSTATECTOMY USING DA ERIC XI N/A 7/19/2022    Procedure: XI ROBOTIC PROSTATECTOMY;  Surgeon: Saturnino Mares MD;  Location: [facility];  Service: Urology;  Laterality: N/A;  ROBOTIC  RADICAL PROSTATECTOMY // BILAT PELVIC LYMPH NODE DISSECTION //    TONSILLECTOMY      TOTAL KNEE ARTHROPLASTY Left     VASECTOMY         Time Tracking:     PT Received On: 07/20/22  PT Start Time: 1045     PT Stop Time: 1100  PT Total Time (min): 15 min     Billable Minutes: Evaluation Low complexity, 15min.       07/20/2022

## 2022-07-20 NOTE — DISCHARGE SUMMARY
[facility] - 9th Floor Med Surg  Urology  Discharge Summary      Patient Name: Obed Daugherty  MRN: [id number]  Admission Date: 7/19/2022  Hospital Length of Stay: 1 days  Discharge Date and Time:  07/20/2022 9:40 AM  Attending Physician: Saturnino Mares MD   Discharging Provider: JUDIE Krishna  Primary Care Physician: Primary Doctor No      Procedure(s) (LRB):  XI ROBOTIC PROSTATECTOMY (N/A)     Indwelling Lines/Drains at time of discharge:   Lines/Drains/Airways     Drain  Duration                Closed/Suction Drain 07/19/22 1434 Other (Comment) LLQ Bulb 10 Fr. <1 day         Urethral Catheter 07/19/22 1225 Silicone 18 Fr. <1 day                Hospital Course (synopsis of major diagnoses, care, treatment, and services provided during the course of the hospital stay):  Mr. Daugherty is a 67-year-old male admitted for observation following an elective robotic radical prostatectomy with bilateral pelvic lymph node dissection for biopsy-proven prostate cancer.  Procedure went as planned without any complications,  mL.  He has remained hemodynamically stable throughout hospital stay.  He is tolerating regular diet without nausea, passing gas.  Reports good pain control.  Discharge instructions discussed; no heavy lifting, bending, pushing, pulling or straining.  Okay to shower, do not soak.  All postoperative medications filled instructions discussed. Home Javed care instructions discussed.  He already has a follow-up appointment to discuss further pathology, remove Javed catheter and remove Javed.        Pending Diagnostic Studies:     Procedure Component Value Units Date/Time    Specimen to Pathology [[id number]] Collected: 07/19/22 1320    Order Status: Sent Lab Status: No result     Specimen: Tissue from Lymph Node; Tissue from Lymph Node; Tissue from Lymph Node; Tissue from Lymph Node; Tissue from Lymph Node; Tissue from Prostate, Right           Final Active Diagnoses:       Problems Resolved During this Admission:    Diagnosis Date Noted Date Resolved POA    PRINCIPAL PROBLEM:  Malignant neoplasm of prostate [C61] 07/19/2022 07/19/2022 Yes       Discharged Condition: good    Disposition: Home or Self Care    Follow Up:   Follow-up Information     Saturnino Mares MD Follow up.    Specialty: Urology  Why: keep previously scheudled follow up appointment  Contact information:  Dhruv Moreira [address] [address]  [phone]                       Patient Instructions:      Lifting restrictions   Order Comments: No heavy lifting, bending, pulling or straining     Notify your health care provider if you experience any of the following:  temperature >100.4     Notify your health care provider if you experience any of the following:  persistent nausea and vomiting or diarrhea     Notify your health care provider if you experience any of the following:  severe uncontrolled pain     Notify your health care provider if you experience any of the following:  redness, tenderness, or signs of infection (pain, swelling, redness, odor or green/yellow discharge around incision site)     Shower on day dressing removed (No bath)     Medications:  Reconciled Home Medications:      Medication List      CONTINUE taking these medications    cholecalciferol (vitamin D3) 50 mcg (2,000 unit) Cap capsule  Commonly known as: VITAMIN D3  Take 4,000 Units by mouth once daily.     cyanocobalamin 1000 MCG tablet  Commonly known as: VITAMIN B-12  Take 2 tablets by mouth once daily.     metoprolol tartrate 50 MG tablet  Commonly known as: LOPRESSOR  Take 25 mg by mouth 2 (two) times daily.        STOP taking these medications    oxyCODONE 30 MG Tab  Commonly known as: ROXICODONE     zolpidem 5 MG Tab  Commonly known as: JUDIE Herring  Urology  [facility] - 9th Floor Med Surg

## 2022-07-20 NOTE — PROGRESS NOTES
UROLOGY  PROGRESS  NOTE    Obed Daugherty 1955  [id number]  7/20/2022    Mr. Daugherty is status post robotic radical prostatectomy with bilateral pelvic lymph node dissection POD 1. Doing well. Reports good pain control. Tolerating a regular diet without nausea, passing gas. VSS; afebrile.      Intake/Output:  No intake/output data recorded.  I/O last 3 completed shifts:  In: 1000 [IV Piggyback:1000]  Out: 1045 [Urine:925; Drains:120]       Exam:    NAD  Card RRR  Resp unlabored  Abd +BS, soft, surgical incision D&I   pink urine draining to  bag      Recent Results (from the past 24 hour(s))   Type & Screen    Collection Time: 07/19/22  9:29 AM   Result Value Ref Range    Group & Rh A POS     Indirect Adriano GEL NEG    Prepare RBC 2 Units; surgery    Collection Time: 07/19/22  9:29 AM   Result Value Ref Range    UNIT NUMBER [id number]     UNIT ABO/RH A POS     DISPENSE STATUS Selected     Unit Expiration [id number]     Product Code P1849A08     Unit Blood Type Code 6200     CROSSMATCH INTERPRETATION Compatible     UNIT NUMBER [id number]     UNIT ABO/RH A POS     DISPENSE STATUS Selected     Unit Expiration [id number]     Product Code N5873E02     Unit Blood Type Code 6200     CROSSMATCH INTERPRETATION Compatible    CBC with Differential    Collection Time: 07/19/22 10:18 AM   Result Value Ref Range    WBC 5.2 4.5 - 11.5 x10(3)/mcL    RBC 4.22 (L) 4.70 - 6.10 x10(6)/mcL    Hgb 14.5 14.0 - 18.0 gm/dL    Hct 41.1 (L) 42.0 - 52.0 %    MCV 97.4 (H) 80.0 - 94.0 fL    MCH 34.4 (H) 27.0 - 31.0 pg    MCHC 35.3 33.0 - 36.0 mg/dL    RDW 11.9 11.5 - 17.0 %    Platelet 164 130 - 400 x10(3)/mcL    MPV 8.8 7.4 - 10.4 fL    Neut % 65.7 %    Lymph % 17.2 %    Mono % 13.8 %    Eos % 2.3 %    Basophil % 0.4 %    Lymph # 0.89 0.6 - 4.6 x10(3)/mcL    Neut # 3.4 2.1 - 9.2 x10(3)/mcL    Mono # 0.71 0.1 - 1.3 x10(3)/mcL    Eos # 0.12 0 - 0.9 x10(3)/mcL    Baso # 0.02 0 - 0.2 x10(3)/mcL    IG# 0.03 0 - 0.04 x10(3)/mcL     IG% 0.6 %    NRBC% 0.0 %   Basic Metabolic Panel    Collection Time: 07/19/22 10:18 AM   Result Value Ref Range    Sodium Level 138 136 - 145 mmol/L    Potassium Level 4.1 3.5 - 5.1 mmol/L    Chloride 101 98 - 107 mmol/L    Carbon Dioxide 26 23 - 31 mmol/L    Glucose Level 117 (H) 82 - 115 mg/dL    Blood Urea Nitrogen 8.8 8.4 - 25.7 mg/dL    Creatinine 1.01 0.73 - 1.18 mg/dL    BUN/Creatinine Ratio 9     Calcium Level Total 9.5 8.8 - 10.0 mg/dL    Estimated GFR-Non  >60 mls/min/1.73/m2    Anion Gap 11.0 mEq/L   ABORH RETYPE    Collection Time: 07/19/22 11:45 AM   Result Value Ref Range    ABORH Retype A POS    Hemoglobin and Hematocrit    Collection Time: 07/19/22  4:09 PM   Result Value Ref Range    Hgb 13.2 (L) 14.0 - 18.0 gm/dL    Hct 39.0 (L) 42.0 - 52.0 %   Basic metabolic panel    Collection Time: 07/20/22  5:06 AM   Result Value Ref Range    Sodium Level 134 (L) 136 - 145 mmol/L    Potassium Level 4.0 3.5 - 5.1 mmol/L    Chloride 101 98 - 107 mmol/L    Carbon Dioxide 26 23 - 31 mmol/L    Glucose Level 147 (H) 82 - 115 mg/dL    Blood Urea Nitrogen 9.2 8.4 - 25.7 mg/dL    Creatinine 0.83 0.73 - 1.18 mg/dL    BUN/Creatinine Ratio 11     Calcium Level Total 8.1 (L) 8.8 - 10.0 mg/dL    Estimated GFR-Non  >60 mls/min/1.73/m2    Anion Gap 7.0 mEq/L         Assessment:  Prostate cancer status post robotic radical prostatectomy with bilateral pelvic lymph node dissection      Plan:  Ambulate  D/c home    JUDIE Krishna

## 2022-07-22 LAB
DHEA SERPL-MCNC: NORMAL
ESTROGEN SERPL-MCNC: NORMAL PG/ML
INSULIN SERPL-ACNC: NORMAL U[IU]/ML
LAB AP CLINICAL INFORMATION: NORMAL
LAB AP GROSS DESCRIPTION: NORMAL
LAB AP REPORT FOOTNOTES: NORMAL
T3RU NFR SERPL: NORMAL %

## 2022-07-23 LAB
ABO + RH BLD: NORMAL
ABO + RH BLD: NORMAL
BLD PROD TYP BPU: NORMAL
BLD PROD TYP BPU: NORMAL
BLOOD UNIT EXPIRATION DATE: NORMAL
BLOOD UNIT EXPIRATION DATE: NORMAL
BLOOD UNIT TYPE CODE: 6200
BLOOD UNIT TYPE CODE: 6200
CROSSMATCH INTERPRETATION: NORMAL
CROSSMATCH INTERPRETATION: NORMAL
DISPENSE STATUS: NORMAL
DISPENSE STATUS: NORMAL
UNIT NUMBER: NORMAL
UNIT NUMBER: NORMAL

## (undated) DEVICE — SEE MEDLINE ITEM 154981

## (undated) DEVICE — SOL IRR NACL .9% 1000CC

## (undated) DEVICE — SUT MCRYL PLUS 4-0 PS2 27IN

## (undated) DEVICE — Device

## (undated) DEVICE — PAD PINK TRENDELENBURG POS XL

## (undated) DEVICE — SPONGE LAP 4X18 PREWASHED

## (undated) DEVICE — IRRIGATOR SUCTION W/TIP

## (undated) DEVICE — ADHESIVE DERMABOND ADVANCED

## (undated) DEVICE — SUT ETHILON 3-0 FS-1 30

## (undated) DEVICE — SEE MEDLINE ITEM 157150

## (undated) DEVICE — STAPLER ECHELON PWR 45MM

## (undated) DEVICE — DRAPE COLUMN DAVINCI XI

## (undated) DEVICE — KIT GEN LAPAROSCOPY LAFAYETTE

## (undated) DEVICE — DRAPE ARM DAVINCI XI

## (undated) DEVICE — SUT VICRYL+ 27 UR-6 VIOL

## (undated) DEVICE — ELECTRODE REM PLYHSV RETURN 9

## (undated) DEVICE — SEAL UNIVERSAL 5MM-8MM XI

## (undated) DEVICE — SET TRI-LUMEN FILTERED TUBE

## (undated) DEVICE — SUT COAT VCRL VIOL RB-1 2-0 27

## (undated) DEVICE — BAG SPEC RETRV ENDO 4X6IN DISP

## (undated) DEVICE — GOWN X-LG STERILE BACK

## (undated) DEVICE — SOL CLEARIFY VISUALIZATION LAP

## (undated) DEVICE — DRAIN SURG HUBLESS 30CM 15FR

## (undated) DEVICE — CLIP HEMO-LOK MLX LARGE LF

## (undated) DEVICE — COVER STERILE-Z BACK TABLE XL

## (undated) DEVICE — RELOAD ECHELON ENDOPATH 45MM

## (undated) DEVICE — NDL 18GA X1 1/2 REG BEVEL

## (undated) DEVICE — JELLY SURGILUBE LUBE TUBE 2OZ

## (undated) DEVICE — PORT AIRSEAL 12/120MM LPI

## (undated) DEVICE — HEMOSTAT SURGICEL SNOW 2X4IN

## (undated) DEVICE — KIT URINE METER 350ML STAT LOC

## (undated) DEVICE — SUT STRATFIX 15CM RB-1 3-0

## (undated) DEVICE — SYR 10CC LUER LOCK

## (undated) DEVICE — SYR 50ML CATH TIP

## (undated) DEVICE — RESERVOIR JACKSON-PRATT 100CC

## (undated) DEVICE — COVER MAYO STAND REINFRCD 30

## (undated) DEVICE — OBTURATOR BLADELESS 8MM XI CLR

## (undated) DEVICE — COVER TIP CURVED SCISSORS XI

## (undated) DEVICE — NDL INSUF ULTRA VERESS 120MM

## (undated) DEVICE — KIT SURGICAL TURNOVER

## (undated) DEVICE — CHLORAPREP W TINT 26ML APPL

## (undated) DEVICE — GLOVE PROTEXIS HYDROGEL SZ7.5